# Patient Record
Sex: MALE | Race: WHITE
[De-identification: names, ages, dates, MRNs, and addresses within clinical notes are randomized per-mention and may not be internally consistent; named-entity substitution may affect disease eponyms.]

---

## 2017-06-05 ENCOUNTER — HOSPITAL ENCOUNTER (OUTPATIENT)
Dept: HOSPITAL 45 - C.RAD | Age: 67
Discharge: HOME | End: 2017-06-05
Attending: PHYSICIAN ASSISTANT
Payer: COMMERCIAL

## 2017-06-05 DIAGNOSIS — R13.10: Primary | ICD-10-CM

## 2017-06-05 NOTE — DIAGNOSTIC IMAGING REPORT
(BARIUM SWALLOW) ESOPHAGUS



CLINICAL HISTORY: Difficulty swallowing.   



COMPARISON STUDY:  None.



FLUOROSCOPY TIME: 1 minute.



FINDINGS: 22 fluoroscopic images were obtained. A 13 mm barium passed freely

into the stomach. There was mild to moderate esophageal dysmotility. No

esophageal mass or stricture was identified. A small hiatal hernia was noted. No

reflux was elicited.



IMPRESSION:  



1. No esophageal mass or stricture. 13 mm barium passed into stomach.



2. Small hiatal hernia.



3. Mild to moderate esophageal dysmotility. 







Electronically signed by:  Donta Hernández M.D.

6/5/2017 7:47 AM



Dictated Date/Time:  6/5/2017 7:46 AM

## 2017-09-11 ENCOUNTER — HOSPITAL ENCOUNTER (OUTPATIENT)
Dept: HOSPITAL 45 - C.LAB1850 | Age: 67
Discharge: HOME | End: 2017-09-11
Attending: INTERNAL MEDICINE
Payer: COMMERCIAL

## 2017-09-11 DIAGNOSIS — Z00.00: Primary | ICD-10-CM

## 2017-09-11 DIAGNOSIS — Z12.5: ICD-10-CM

## 2017-09-11 DIAGNOSIS — N52.9: ICD-10-CM

## 2017-09-11 LAB
ALBUMIN/GLOB SERPL: 1.1 {RATIO} (ref 0.9–2)
ALP SERPL-CCNC: 67 U/L (ref 45–117)
ALT SERPL-CCNC: 208 U/L (ref 12–78)
ANION GAP SERPL CALC-SCNC: 7 MMOL/L (ref 3–11)
APPEARANCE UR: CLEAR
AST SERPL-CCNC: 102 U/L (ref 15–37)
BASOPHILS # BLD: 0.03 K/UL (ref 0–0.2)
BASOPHILS NFR BLD: 0.5 %
BILIRUB UR-MCNC: (no result) MG/DL
BUN SERPL-MCNC: 20 MG/DL (ref 7–18)
BUN/CREAT SERPL: 18.3 (ref 10–20)
CALCIUM SERPL-MCNC: 8.6 MG/DL (ref 8.5–10.1)
CHLORIDE SERPL-SCNC: 104 MMOL/L (ref 98–107)
CHOLEST/HDLC SERPL: 4.7 {RATIO}
CO2 SERPL-SCNC: 28 MMOL/L (ref 21–32)
COLOR UR: YELLOW
COMPLETE: YES
CREAT SERPL-MCNC: 1.1 MG/DL (ref 0.6–1.4)
EOSINOPHIL NFR BLD AUTO: 214 K/UL (ref 130–400)
GLOBULIN SER-MCNC: 3.4 GM/DL (ref 2.5–4)
GLUCOSE SERPL-MCNC: 107 MG/DL (ref 70–99)
GLUCOSE UR QL: 46 MG/DL
HCT VFR BLD CALC: 47.1 % (ref 42–52)
IG%: 0.3 %
IMM GRANULOCYTES NFR BLD AUTO: 32.1 %
KETONES UR QL STRIP: 141 MG/DL
LYMPHOCYTES # BLD: 2 K/UL (ref 1.2–3.4)
MANUAL MICROSCOPIC REQUIRED?: NO
MCH RBC QN AUTO: 31.5 PG (ref 25–34)
MCHC RBC AUTO-ENTMCNC: 34.8 G/DL (ref 32–36)
MCV RBC AUTO: 90.4 FL (ref 80–100)
MONOCYTES NFR BLD: 12 %
NEUTROPHILS # BLD AUTO: 5.6 %
NEUTROPHILS NFR BLD AUTO: 49.5 %
NITRITE UR QL STRIP: (no result)
NITRITE UR QL STRIP: 156 MG/DL (ref 0–150)
PH UR STRIP: 7 [PH] (ref 4.5–7.5)
PH UR: 218 MG/DL (ref 0–200)
PMV BLD AUTO: 10.1 FL (ref 7.4–10.4)
POTASSIUM SERPL-SCNC: 4 MMOL/L (ref 3.5–5.1)
PSA SERPL-MCNC: 0.87 NG/ML (ref 0–4)
RBC # BLD AUTO: 5.21 M/UL (ref 4.7–6.1)
REVIEW REQ?: NO
SODIUM SERPL-SCNC: 139 MMOL/L (ref 136–145)
SP GR UR STRIP: 1.02 (ref 1–1.03)
TSH SERPL-ACNC: 2.01 UIU/ML (ref 0.3–4.5)
URINE EPITHELIAL CELL AUTO: (no result) /LPF (ref 0–5)
UROBILINOGEN UR-MCNC: (no result) MG/DL
VERY LOW DENSITY LIPOPROT CALC: 31 MG/DL
WBC # BLD AUTO: 6.23 K/UL (ref 4.8–10.8)
ZZUR CULT IF INDIC CLEAN CATCH: NO

## 2017-09-13 LAB — TESTOST SERPL-MCNC: 464 NG/DL (ref 250–1100)

## 2017-09-22 NOTE — CODING QUERY MEDICAL NECESSITY
SUPPORTING DIAGNOSIS NEEDED





A supporting diagnosis is required for the test/procedure performed on this patient in 
order for us to be reimbursed by the patient's insurance. Please provide a supporting 
diagnosis for the following test/procedure listed below next to the test name along with 
your signature. 



*If there is no additional diagnosis for this patient that would support the following 
test/procedure please document that below next to the test/procedure.



Test(s)/Procedure(s) that require a supporting diagnosis:







* PSA                                DIAGNOSIS:







Provider Signature:  ______________________________  Date:  _______



Thank you  

Christy Rose

[email or web address]

Phone:  [phone]

Fax:  [phone]



Once completed, please kindly fax back to [phone]



For questions please call [phone]

## 2017-11-13 ENCOUNTER — HOSPITAL ENCOUNTER (OUTPATIENT)
Dept: HOSPITAL 45 - C.LAB1850 | Age: 67
Discharge: HOME | End: 2017-11-13
Attending: INTERNAL MEDICINE
Payer: COMMERCIAL

## 2017-11-13 DIAGNOSIS — K21.9: ICD-10-CM

## 2017-11-13 DIAGNOSIS — R74.8: ICD-10-CM

## 2017-11-13 DIAGNOSIS — Z00.00: Primary | ICD-10-CM

## 2017-11-13 DIAGNOSIS — N40.0: ICD-10-CM

## 2017-11-13 DIAGNOSIS — E78.5: ICD-10-CM

## 2017-11-13 LAB
ALP SERPL-CCNC: 65 U/L (ref 45–117)
ALT SERPL-CCNC: 113 U/L (ref 12–78)
AST SERPL-CCNC: 42 U/L (ref 15–37)

## 2018-05-22 ENCOUNTER — HOSPITAL ENCOUNTER (OUTPATIENT)
Dept: HOSPITAL 45 - C.GI | Age: 68
Discharge: HOME | End: 2018-05-22
Attending: INTERNAL MEDICINE
Payer: COMMERCIAL

## 2018-05-22 VITALS
HEIGHT: 72.01 IN | WEIGHT: 280.58 LBS | HEIGHT: 72.01 IN | WEIGHT: 280.58 LBS | BODY MASS INDEX: 38 KG/M2 | BODY MASS INDEX: 38 KG/M2

## 2018-05-22 VITALS — HEART RATE: 60 BPM | DIASTOLIC BLOOD PRESSURE: 79 MMHG | OXYGEN SATURATION: 95 % | SYSTOLIC BLOOD PRESSURE: 124 MMHG

## 2018-05-22 DIAGNOSIS — D12.2: ICD-10-CM

## 2018-05-22 DIAGNOSIS — Z87.891: ICD-10-CM

## 2018-05-22 DIAGNOSIS — K57.30: ICD-10-CM

## 2018-05-22 DIAGNOSIS — E66.9: ICD-10-CM

## 2018-05-22 DIAGNOSIS — Z80.0: ICD-10-CM

## 2018-05-22 DIAGNOSIS — K64.8: ICD-10-CM

## 2018-05-22 DIAGNOSIS — M19.90: ICD-10-CM

## 2018-05-22 DIAGNOSIS — I50.9: ICD-10-CM

## 2018-05-22 DIAGNOSIS — Z12.11: Primary | ICD-10-CM

## 2018-05-22 DIAGNOSIS — Z88.0: ICD-10-CM

## 2018-05-22 NOTE — ENDO HISTORY AND PHYSICAL
History & Physical


Date of Service:


May 22, 2018.


Chief Complaint:


Screening


Referring Physician:


Memo Ivy


History of Present Illness


69 yo CM who presents for screening colonoscopy.





Past Surgical History


Hx Cardiac Surgery:  No


Hx Internal Defibrillator:  No


Hx Pacemaker:  No


Hx Abdominal Surgery:  Yes (HERNIA X2)


Hx of Implantable Prosthesis:  No


Hx Post-Op Nausea and Vomiting:  No


Hx Cancer Surgery:  No


Hx Thoracic Surgery:  No


Hx Orthopedic:  No


Hx Urinary Tract Surgery:  No





Family History


Colon CA, Esophogeal CA





Social History


Smoking Status:  Former Smoker


Hx Substance Use:  No


Hx Alcohol Use:  No





Allergies


Coded Allergies:  


     Penicillins (Verified  Allergy, Mild, HIVES, 5/22/18)





Current Medications





Reported Home Medications








 Medications  Dose


 Route/Sig


 Max Daily Dose Days Date Category


 


 Proair Respiclick


  (Albuterol


 Sulfate) 108


 Mcg/Act Aer  2 Puffs


 INH Q4 PRN


    5/10/18 Reported


 


 Prilosec


  (Omeprazole) 20


 Mg Capcr  20 Mg


 PO DAILY


    5/10/18 Reported


 


 Doxazosin


 Mesylate 4 Mg Tab  1 Tab


 PO DAILY


    5/10/18 Reported


 


 Cialis


  (Tadalafil) 5 Mg


 Tab  1 Tab


 PO AS DIRECTED


   30 5/10/18 Reported


 


 Aspirin Ec


  (Aspirin) 81 Mg


 Tab  81 Mg


 PO DAILY


    5/10/18 Reported











Vital Signs


Weight (Kilograms):  127.27


Height (Feet):  6


Height (Inches):  0











  Date Time  Temp Pulse Resp B/P (MAP) Pulse Ox O2 Delivery O2 Flow Rate FiO2


 


5/22/18 08:12 36.6 56 18 120/74 (89) 95 Room Air  











Physical Exam


General Appearance:  WD/WN, no apparent distress


Respiratory/Chest:  


   Auscultation:  breath sounds normal


Cardiovascular:  


   Heart Auscultation:  RRR


Abdomen:  


   Bowel Sounds:  normal


   Inspection & Palpation:  soft, non-distended, no tenderness, guarding & 

rebound





Assessment and Plan


Assessment:


69 yo CM who presents for screening colonoscopy.








Plan:


Proceed with colonoscopy.

## 2018-05-22 NOTE — GI REPORT
Patient Name: Donta Torrez

Procedure Date: 5/22/2018 8:22 AM

MRN: [id number]

Account Number: [id number]

YOB: 1950

Admit Type: Outpatient

Age: 68

Gender: Male

Attending MD: Chandrakant Collier DO

Procedure:            Colonoscopy

Providers:            Chandrakant Collier DO

Referring MD:         Memo Ivy

Indications:          Screening for colorectal malignant neoplasm

Medicines:            Monitored Anesthesia Care

Complications:        No immediate complications.

Estimated Blood Loss: Estimated blood loss: none.

Procedure:            Pre-Anesthesia Assessment:

                      - Prior to the procedure, a History and Physical was 

                      performed, and patient medications and allergies were 

                      reviewed. The patient's tolerance of previous 

                      anesthesia was also reviewed. The risks and benefits of 

                      the procedure and the sedation options and risks were 

                      discussed with the patient. All questions were 

                      answered, and informed consent was obtained. Prior 

                      Anticoagulants: The patient has taken aspirin, last 

                      dose was 2 days prior to procedure. ASA Grade 

                      Assessment: III - A patient with severe systemic 

                      disease. After reviewing the risks and benefits, the 

                      patient was deemed in satisfactory condition to undergo 

                      the procedure.

                      After I obtained informed consent, the scope was passed 

                      under direct vision. Throughout the procedure, the 

                      patient's blood pressure, pulse, and oxygen saturations 

                      were monitored continuously. The Scope was introduced 

                      through the anus and advanced to the terminal ileum. 

                      The colonoscopy was performed without difficulty. The 

                      patient tolerated the procedure well. The quality of 

                      the bowel preparation was good. The terminal ileum, 

                      ileocecal valve, appendiceal orifice, and rectum were 

                      photographed.

Findings:

     The perianal and digital rectal examinations were normal.

     A 5 mm polyp was found in the ascending colon. The polyp was sessile. 

     Biopsies were taken with a cold forceps for histology.

     Scattered small-mouthed diverticula were found in the entire colon.

     Non-bleeding internal hemorrhoids were found during retroflexion. The 

     hemorrhoids were small.

Impression:           - One 5 mm polyp in the ascending colon. Biopsied.

                      - Diverticulosis in the entire examined colon.

                      - Non-bleeding internal hemorrhoids.

Recommendation:       - Resume previous diet.

                      - Continue present medications.

                      - Repeat colonoscopy for surveillance based on 

                      pathology results.

                      - Return to primary care physician as previously 

                      scheduled.

Chandrakant Collier DO

5/22/2018 9:21:28 AM

This report has been signed electronically.

Note Initiated On: 5/22/2018 8:22 AM

Number of Addenda: 0

     I attest to the content of the Intraoperative Record and orders 

     documented therein, exceptions below



{[id number]}

## 2018-05-22 NOTE — DISCHARGE INSTRUCTIONS
Endoscopy Patient Instructions


Date / Procedure(s) Performed


May 22, 2018.


Colonoscopy





Allergy Information


Coded Allergies:  


     Penicillins (Verified  Allergy, Mild, HIVES, 5/22/18)





Discharge Date / Findings


May 22, 2018.


Colon polyp


Diverticulosis


Internal hemorrhoids





Medication Instructions


Stopped Medication(s):  


Aspirin last taken on 05/19/18


OK to resume all medications today as prescribed





Reported Home Medications








 Medications  Dose


 Route/Sig


 Max Daily Dose Days Date Category


 


 Proair Respiclick


  (Albuterol


 Sulfate) 108


 Mcg/Act Aer  2 Puffs


 INH Q4 PRN


    5/10/18 Reported


 


 Prilosec


  (Omeprazole) 20


 Mg Capcr  20 Mg


 PO DAILY


    5/10/18 Reported


 


 Doxazosin


 Mesylate 4 Mg Tab  1 Tab


 PO DAILY


    5/10/18 Reported


 


 Cialis


  (Tadalafil) 5 Mg


 Tab  1 Tab


 PO AS DIRECTED


   30 5/10/18 Reported


 


 Aspirin Ec


  (Aspirin) 81 Mg


 Tab  81 Mg


 PO DAILY


    5/10/18 Reported











Provider Instructions





Activity Restrictions





-  No exercising or heavy lifting for 24 hours. 


-  Do not drink alcohol the day of the procedure.


-  Do not drive a car or operate machinery until the day after the procedure.


-  Do not make any important decisions or sign important papers in 24 hours 

after the procedure.





Following Day:





-  Return to full activity which may include returning to work/school.





Diet





Start your diet with liquids and light foods (jello, soup, juice, toast).  Then 

eat your usual diet if not nauseated.





Treatment For Common After Affects





For mild abdominal pain, bloating, or excessive gas:





-  Rest


-  Eat lightly


-  Lie on right side





Follow-Up Information


Follow-up with Memo Ivy as scheduled





Anesthesia Information





What You Should Know





You have had a procedure that required some medicine to reduce anxiety and 

discomfort. This treatment is called moderate sedation.  


After receiving the treatment, you may be sleepy, but you will be able to 

breathe on your own.  The effects of the treatment may last for several hours.








Follow these instructions along with Activity/Diet recommendations noted above:





*  Do NOT do anything where dizziness or clumsiness would be dangerous.





*  Rest quietly at home today, then you can be up and about tomorrow.





*  Have a responsible person stay with you the rest of today.





*  You may have had an I.V. today.  If so, you may take the dressing off later 

today.





Recommendations


 


Call your doctor if:





*  Trouble breathing 





*  Continuous vomiting for more than 24 hours








*  Temperature above 101 degrees





*  Severe abdominal pain or bloating





*  Pain not relieved by pain medicine ordered





*  There is increased drainage or redness from any incision





*  A large amount of rectal bleeding greater than 2-3 tablespoons. 


   (If you had a polyp/s removed or have hemorrhoids, a small amount of blood -


    from the rectum is to be expected.)





*  You have any unanswered questions or concerns.








IN THE EVENT OF A SERIOUS EMERGENCY, GO TO THE NEAREST EMERGENCY ROOM








       Your discharge instructions were prepared by provider Chandrakant Collier.





 Patient Instructions Signature Page














Donta Torrez 











Patient (or Guardian) Signature/Date:____________________________________ I 

have read and understand the instructions given to me by my caregivers.








Caregiver/RN/Doctor Signature/Date:____________________________________











The above-named patient and/or guardian has received patient instructions on 

this date.





























+  Original Patient Signature Page (only) stays with chart.  Please make copy 

for patient.

## 2018-05-22 NOTE — ANESTHESIOLOGY PROGRESS NOTE
Anesthesia Post Op Note


Date & Time


May 22, 2018 at 09:54





Vital Signs


Pain Intensity:  0





Vital Signs Past 12 Hours








  Date Time  Temp Pulse Resp B/P (MAP) Pulse Ox O2 Delivery O2 Flow Rate FiO2


 


5/22/18 09:41  60 20 124/79 (94) 95 Room Air  


 


5/22/18 09:32  56 17 103/72 (82) 95 Room Air  


 


5/22/18 09:22  56 20 111/49 (69) 95 Room Air  


 


5/22/18 09:07  56 17 97/49 (65) 92 Room Air  


 


5/22/18 08:12 36.6 56 18 120/74 (89) 95 Room Air  











Notes


Mental Status:  alert / awake / arousable, participated in evaluation


Pt Amnestic to Procedure:  Yes


Nausea / Vomiting:  adequately controlled


Pain:  adequately controlled


Airway Patency, RR, SpO2:  stable & adequate


BP & HR:  stable & adequate


Hydration State:  stable & adequate


Anesthetic Complications:  no major complications apparent

## 2018-08-07 ENCOUNTER — HOSPITAL ENCOUNTER (OUTPATIENT)
Dept: HOSPITAL 45 - C.LAB1850 | Age: 68
Discharge: HOME | End: 2018-08-07
Attending: INTERNAL MEDICINE
Payer: COMMERCIAL

## 2018-08-07 DIAGNOSIS — R74.8: Primary | ICD-10-CM

## 2018-08-07 LAB
ALBUMIN SERPL-MCNC: 3.9 GM/DL (ref 3.4–5)
ALP SERPL-CCNC: 58 U/L (ref 45–117)
ALT SERPL-CCNC: 64 U/L (ref 12–78)
AST SERPL-CCNC: 32 U/L (ref 15–37)
PROT SERPL-MCNC: 7 GM/DL (ref 6.4–8.2)